# Patient Record
Sex: FEMALE | Race: BLACK OR AFRICAN AMERICAN | NOT HISPANIC OR LATINO | Employment: STUDENT | URBAN - METROPOLITAN AREA
[De-identification: names, ages, dates, MRNs, and addresses within clinical notes are randomized per-mention and may not be internally consistent; named-entity substitution may affect disease eponyms.]

---

## 2017-01-04 ENCOUNTER — INITIAL CONSULT (OUTPATIENT)
Dept: OPTOMETRY | Facility: CLINIC | Age: 22
End: 2017-01-04
Payer: COMMERCIAL

## 2017-01-04 DIAGNOSIS — H52.01 HYPEROPIA, RIGHT: ICD-10-CM

## 2017-01-04 DIAGNOSIS — H52.31 ANISOMETROPIA: ICD-10-CM

## 2017-01-04 DIAGNOSIS — H53.021 ANISOMETROPIC AMBLYOPIA OF RIGHT EYE: Primary | ICD-10-CM

## 2017-01-04 PROCEDURE — 92015 DETERMINE REFRACTIVE STATE: CPT | Mod: S$GLB,,, | Performed by: OPTOMETRIST

## 2017-01-04 PROCEDURE — 99999 PR PBB SHADOW E&M-EST. PATIENT-LVL II: CPT | Mod: PBBFAC,,, | Performed by: OPTOMETRIST

## 2017-01-04 PROCEDURE — 92004 COMPRE OPH EXAM NEW PT 1/>: CPT | Mod: S$GLB,,, | Performed by: OPTOMETRIST

## 2017-01-04 NOTE — MR AVS SNAPSHOT
[facility]  [address]  [place] [address]  Phone: [phone]                  Jia Marcelo   2017 2:40 PM   Initial consult    Description:  Female : 1995   Provider:  Betty Magana OD   [facility]           Reason for Visit     Eye Exam                To Do List           Goals (5 Years of Data)     None      Follow-Up and Disposition     Return in about 1 year (around 2018) for annual eye examination.      [facility]     [facility] -  Assistance  Registered nurses in the [facility] provide clinical advisement, health education, appointment booking, and other advisory services.  Call for this free service at [phone].             Medications           Message regarding Medications     Verify the changes and/or additions to your medication regime listed below are the same as discussed with your clinician today.  If any of these changes or additions are incorrect, please notify your healthcare provider.             Verify that the below list of medications is an accurate representation of the medications you are currently taking.  If none reported, the list may be blank. If incorrect, please contact your healthcare provider. Carry this list with you in case of emergency.           Current Medications     cetirizine (ZYRTEC) 10 MG tablet Take 1 tablet (10 mg total) by mouth once daily.    drospirenone-ethinyl estradiol (TRISTEN, 28,) 3-0.03 mg per tablet Take 1 tablet by mouth once daily.    EPIDUO 0.1-2.5 % GlwP JOSE CRUZ AA ON FACE QHS    fluticasone (FLONASE) 50 mcg/actuation nasal spray 1 spray by Each Nare route once daily.    naproxen sodium (ANAPROX) 275 MG tablet Take 1 tablet (275 mg total) by mouth every 8 (eight) hours as needed.    propranolol (INDERAL [place]) 120 MG 24 hr capsule Take 1 capsule (120 mg total) by mouth once daily.    pseudoephedrine-guaifenesin (MUCINEX D MAXIMUM  STRENGTH) 120-1,200 mg Tb12 1 tab every 12 hours as needed for congestion    sumatriptan (IMITREX) 25 MG Tab Two at start of headache; repeat in 2 hours if needed           Clinical Reference Information           Allergies as of 1/4/2017     Flagyl [Metronidazole Hcl]      Immunizations Administered on Date of Encounter - 1/4/2017     None      Instructions    Thank you very much for coming in for your eye examination. It was a pleasure working with you today. Below is some information you might find helpful.     AMBLYOPIA (LAZY EYE)    Amblyopia, also known as lazy eye, occurs when the central vision does not develop properly.    Amblyopia generally develops in young children, before age six. Its symptoms often are noted by parents in the form of squinting or closing one eye to see.  Other signs include overall poor visual acuity, eyestrain and headaches.    Causes of Amblyopia:    Trauma to the eye at any age can cause amblyopia, as well as a strong uncorrected refractive error (nearsightedness or farsightedness) or an eye muscle imbalance (strabismus).     Amblyopia Treatment    It's important to correct amblyopia as early as possible to allow maximum development of vision.    Children can be treated by patching the stronger eye, providing glasses to correct nearsightedness or farsightedness,  and in some cases using atropine eye drops.  Placing a patch over the stronger eye will force the weaker eye to learn to see.  Patching may be required for several hours a day and may continue for weeks or months.  Amblyopia will not go away on its own, and untreated  can lead to permanent visual problems and poor depth perception. If later in life the child's stronger eye develops disease or is injured, he or she will be dependent on the poor vision of the amblyopic eye, so it is best to treat amblyopia early on.    Betty Magana, OD

## 2017-01-04 NOTE — PROGRESS NOTES
HPI     Patient here for comprehensive eye exam   Pt states she is amblyopic and has not had an eye exam in 2 years. Pt was   8-10 when she started wearing glasses.   Pt states that she has a few older pair of glasses that she only usually   wear while reading or on the computer, glasses aren't with pt today.   Pt reports satisfactory distance vision without glasses.     (+)drops uses generic OTC lubricating drop, rarely uses maybe a few times   ever 3 months   (-)pain  (-)flashes  (-)floaters  (-)diplopia    Diabetic no  LBS n/a  No results found for: HGBA1C    OCULAR HISTORY  Last Eye Exam 2 yeas ago   (-)eye surgery   (-)eye injury   (+)diagnosed or treated for any eye conditions or diseases: amblyopia     FAMILY HISTORY  (+)Glaucoma maternal great grandmother   (-)Macular Degeneration none          Last edited by Betty Magana, OD on 1/4/2017  3:43 PM.         Assessment /Plan     For exam results, see Encounter Report.    Anisometropic amblyopia of right eye   Cause of reduced best-corrected visual acuity OD (20/70+1). Pt started wearing glasses when she was 8-10 years old. Recommended full-time wear of polycarbonate lenses to protect OS.    Anisometropia  Hyperopia, right   New glasses prescription released, adaptation expected.  Recommended full-time wear of polycarbonate lenses.  Eyeglass Final Rx     Eyeglass Final Rx      Sphere Cylinder Axis   Right +3.25 +0.50 130   Left -0.25 +0.50 075       Type:  SVL    Expiration Date:  1/5/2018    Comments:  Opposite signs correct.  Polycarbonate, full-time wear.                 RTC 1 year

## 2017-01-04 NOTE — PATIENT INSTRUCTIONS
Thank you very much for coming in for your eye examination. It was a pleasure working with you today. Below is some information you might find helpful.     AMBLYOPIA (LAZY EYE)    Amblyopia, also known as lazy eye, occurs when the central vision does not develop properly.    Amblyopia generally develops in young children, before age six. Its symptoms often are noted by parents in the form of squinting or closing one eye to see.  Other signs include overall poor visual acuity, eyestrain and headaches.    Causes of Amblyopia:    Trauma to the eye at any age can cause amblyopia, as well as a strong uncorrected refractive error (nearsightedness or farsightedness) or an eye muscle imbalance (strabismus).     Amblyopia Treatment    It's important to correct amblyopia as early as possible to allow maximum development of vision.    Children can be treated by patching the stronger eye, providing glasses to correct nearsightedness or farsightedness,  and in some cases using atropine eye drops.  Placing a patch over the stronger eye will force the weaker eye to learn to see.  Patching may be required for several hours a day and may continue for weeks or months.  Amblyopia will not go away on its own, and untreated  can lead to permanent visual problems and poor depth perception. If later in life the child's stronger eye develops disease or is injured, he or she will be dependent on the poor vision of the amblyopic eye, so it is best to treat amblyopia early on.    Betty Magana OD

## 2017-02-01 ENCOUNTER — OFFICE VISIT (OUTPATIENT)
Dept: OBSTETRICS AND GYNECOLOGY | Facility: CLINIC | Age: 22
End: 2017-02-01
Payer: COMMERCIAL

## 2017-02-01 VITALS
HEIGHT: 61 IN | BODY MASS INDEX: 24.39 KG/M2 | DIASTOLIC BLOOD PRESSURE: 76 MMHG | WEIGHT: 129.19 LBS | SYSTOLIC BLOOD PRESSURE: 118 MMHG

## 2017-02-01 DIAGNOSIS — N94.6 DYSMENORRHEA: ICD-10-CM

## 2017-02-01 DIAGNOSIS — N76.0 ACUTE VAGINITIS: ICD-10-CM

## 2017-02-01 DIAGNOSIS — Z80.3 FAMILY HISTORY OF BREAST CANCER IN FIRST DEGREE RELATIVE: ICD-10-CM

## 2017-02-01 DIAGNOSIS — Z01.419 WOMEN'S ANNUAL ROUTINE GYNECOLOGICAL EXAMINATION: Primary | ICD-10-CM

## 2017-02-01 LAB
CANDIDA RRNA VAG QL PROBE: NEGATIVE
G VAGINALIS RRNA GENITAL QL PROBE: NEGATIVE
T VAGINALIS RRNA GENITAL QL PROBE: NEGATIVE

## 2017-02-01 PROCEDURE — 87480 CANDIDA DNA DIR PROBE: CPT

## 2017-02-01 PROCEDURE — 99999 PR PBB SHADOW E&M-EST. PATIENT-LVL III: CPT | Mod: PBBFAC,,, | Performed by: NURSE PRACTITIONER

## 2017-02-01 PROCEDURE — 99395 PREV VISIT EST AGE 18-39: CPT | Mod: S$GLB,,, | Performed by: NURSE PRACTITIONER

## 2017-02-01 RX ORDER — NAPROXEN 500 MG/1
500 TABLET ORAL 2 TIMES DAILY WITH MEALS
Qty: 60 TABLET | Refills: 2 | Status: SHIPPED | OUTPATIENT
Start: 2017-02-01 | End: 2017-02-22

## 2017-02-01 NOTE — MR AVS SNAPSHOT
[facility] [address]  [address]  [facility] [address]  Phone: [phone]  Fax: [phone]                  Jia Marcelo   2017 2:20 PM   Office Visit    Description:  Female : 1995   Provider:  Kirsten Munoz NP   Department:  [facility] [address]           Reason for Visit     Annual Exam     Vaginitis                To Do List           Future Appointments        Provider Department Dept Phone    2017 2:20 PM Kirsten Munoz NP [facility] [address] [phone]      Goals (5 Years of Data)     None      [facility]     [facility] -  Assistance  Registered nurses in the [facility] provide clinical advisement, health education, appointment booking, and other advisory services.  Call for this free service at [phone].             Medications           Message regarding Medications     Verify the changes and/or additions to your medication regime listed below are the same as discussed with your clinician today.  If any of these changes or additions are incorrect, please notify your healthcare provider.             Verify that the below list of medications is an accurate representation of the medications you are currently taking.  If none reported, the list may be blank. If incorrect, please contact your healthcare provider. Carry this list with you in case of emergency.           Current Medications     cetirizine (ZYRTEC) 10 MG tablet Take 1 tablet (10 mg total) by mouth once daily.    drospirenone-ethinyl estradiol (TRISTEN, 28,) 3-0.03 mg per tablet Take 1 tablet by mouth once daily.    EPIDUO 0.1-2.5 % GlwP JOSE CRUZ AA ON FACE QHS    fluticasone (FLONASE) 50 mcg/actuation nasal spray 1 spray by Each Nare route once daily.    naproxen sodium (ANAPROX) 275 MG tablet Take 1 tablet (275 mg total) by mouth every 8 (eight) hours as needed.    propranolol (INDERAL [place]) 120 MG 24 hr capsule Take 1 capsule  "(120 mg total) by mouth once daily.    sumatriptan (IMITREX) 25 MG Tab Two at start of headache; repeat in 2 hours if needed    pseudoephedrine-guaifenesin (MUCINEX D MAXIMUM STRENGTH) 120-1,200 mg Tb12 1 tab every 12 hours as needed for congestion           Clinical Reference Information           Vital Signs - Last Recorded  Most recent update: 2/1/2017  2:18 PM by Marzena Asif MA    BP Ht Wt LMP BMI    118/76 5' 1" (1.549 m) 58.6 kg (129 lb 3 oz) 01/31/2017 (Approximate) 24.41 kg/m2      Blood Pressure          Most Recent Value    BP  118/76      Allergies as of 2/1/2017     Flagyl [Metronidazole Hcl]      Immunizations Administered on Date of Encounter - 2/1/2017     None      "

## 2017-02-01 NOTE — PROGRESS NOTES
CC: Annual  HPI: Pt is a 21 y.o.  female who presents for routine annual exam. She uses OCPs for contraception. She does not want STD screening.  Pt is requesting a refill for naproxen for her menstrual cramps.  Reports vaginal discharge, itching, odor. Pt is s/p the HPV vaccie series.    Reports  + family history of breast cancer- mother (diagnosed in her early 30's), maternal aunt (diagnosed in her 40's), and paternal grandmother (diagnosed in her 40's).      ROS:  GENERAL: Feeling well overall. Denies fever or chills.   SKIN: Denies rash or lesions.   HEAD: Denies head injury or headache.   NODES: Denies enlarged lymph nodes.   CHEST: Denies chest pain or shortness of breath.   CARDIOVASCULAR: Denies palpitations or left sided chest pain.   ABDOMEN: No abdominal pain, constipation, diarrhea, nausea, vomiting or rectal bleeding.   URINARY: No dysuria, hematuria, or burning on urination.  REPRODUCTIVE: See HPI.   BREASTS: Denies pain, lumps, or nipple discharge.   HEMATOLOGIC: No easy bruisability or excessive bleeding.   MUSCULOSKELETAL: Denies joint pain or swelling.   NEUROLOGIC: Denies syncope or weakness.   PSYCHIATRIC: Denies depression, anxiety or mood swings.    PE:   APPEARANCE: Well nourished, well developed, Black or  female in no acute distress.  NODES: no cervical, supraclavicular, or inguinal lymphadenopathy  BREASTS: Symmetrical, no skin changes or visible lesions. No palpable masses, nipple discharge or adenopathy bilaterally.  ABDOMEN: Soft. No tenderness or masses. No distention. No hernias palpated. No CVA tenderness.  VULVA: No lesions. Normal external female genitalia.  URETHRAL MEATUS: Normal size and location, no lesions, no prolapse.  URETHRA: No masses, tenderness, or prolapse.  VAGINA: Moist. No lesions or lacerations noted. No abnormal discharge present. No odor present.   CERVIX: No lesions or discharge. No cervical motion tenderness.   UTERUS: Normal size, regular shape,  mobile, non-tender.  ADNEXA: No tenderness. No fullness or masses palpated in the adnexal regions.   ANUS PERINEUM: Normal.      Diagnosis:  1. Women's annual routine gynecological examination    2. Acute vaginitis    3. Dysmenorrhea        Plan:   Pap smear not done/ not indicated- < 21y.o.  Affirm  If + BV will given [place]- pt is allergic to Flagyl  Discussed pt will begin annual mammograms at age 31  Naproxen refilled    Orders Placed This Encounter    Vaginosis Screen by DNA Probe    naproxen (NAPROSYN) 500 MG tablet       Patient was counseled today on the new ACS guidelines for cervical cytology screening as well as the current recommendations for breast cancer screening. She was counseled to follow up with her PCP for other routine health maintenance. Counseling session lasted approximately 10 minutes, and all her questions were answered.    Follow-up with me in 1 year for routine exam; pap at age 22      Kirsten Munoz, MARYP-C

## 2017-02-08 ENCOUNTER — TELEPHONE (OUTPATIENT)
Dept: OBSTETRICS AND GYNECOLOGY | Facility: CLINIC | Age: 22
End: 2017-02-08

## 2017-02-08 NOTE — TELEPHONE ENCOUNTER
----- Message from Gina Abdi sent at 2/8/2017  1:44 PM CST -----  Contact: Patient  X _1st Request  _  2nd Request  _  3rd Request    Who:HAILEE PECK [[id number]]    Why:Patient was returning a call back from the staff     What Number to Call Back:Patient can be reached at [phone]    When to Expect a call back: (Before the end of the day)   -- if call after 3:00 call back will be tomorrow.

## 2017-02-08 NOTE — TELEPHONE ENCOUNTER
----- Message from Loni Mathew sent at 2/8/2017 12:52 PM CST -----  Contact: HAILEE PECK [[id number]]  _  1st Request  _  2nd Request  _  3rd Request        Who: HAILEE PECK [[id number]]    Why: Patient states she was told to call when she had another vaginal fissure. Patient states she would like to be seen today.    What Number to Call Back: [phone]    When to Expect a call back: (Before the end of the day)   -- if call after 3:00 call back will be tomorrow.

## 2017-02-08 NOTE — TELEPHONE ENCOUNTER
Scheduled pt on 02/09 at 8:40am with DEBBIE Curtis. Pt aware of time and location of appt, no further questions were asked.

## 2017-02-12 ENCOUNTER — HOSPITAL ENCOUNTER (EMERGENCY)
Facility: OTHER | Age: 22
Discharge: HOME | End: 2017-02-12
Attending: EMERGENCY MEDICINE
Payer: COMMERCIAL

## 2017-02-12 VITALS
TEMPERATURE: 98 F | SYSTOLIC BLOOD PRESSURE: 123 MMHG | BODY MASS INDEX: 24.17 KG/M2 | DIASTOLIC BLOOD PRESSURE: 81 MMHG | HEIGHT: 61 IN | HEART RATE: 75 BPM | RESPIRATION RATE: 16 BRPM | WEIGHT: 128 LBS | OXYGEN SATURATION: 99 %

## 2017-02-12 DIAGNOSIS — G43.909 MIGRAINE WITHOUT STATUS MIGRAINOSUS, NOT INTRACTABLE, UNSPECIFIED MIGRAINE TYPE: Primary | ICD-10-CM

## 2017-02-12 LAB
B-HCG UR QL: NEGATIVE
CTP QC/QA: YES

## 2017-02-12 PROCEDURE — 99283 EMERGENCY DEPT VISIT LOW MDM: CPT | Mod: 25

## 2017-02-12 PROCEDURE — 81025 URINE PREGNANCY TEST: CPT | Performed by: EMERGENCY MEDICINE

## 2017-02-12 PROCEDURE — 25000003 PHARM REV CODE 250: Performed by: PHYSICIAN ASSISTANT

## 2017-02-12 RX ORDER — BUTALBITAL, ACETAMINOPHEN AND CAFFEINE 50; 325; 40 MG/1; MG/1; MG/1
1 TABLET ORAL EVERY 4 HOURS PRN
Qty: 15 TABLET | Refills: 0 | Status: SHIPPED | OUTPATIENT
Start: 2017-02-12 | End: 2017-02-22

## 2017-02-12 RX ORDER — BUTALBITAL, ACETAMINOPHEN AND CAFFEINE 50; 325; 40 MG/1; MG/1; MG/1
2 TABLET ORAL
Status: COMPLETED | OUTPATIENT
Start: 2017-02-12 | End: 2017-02-12

## 2017-02-12 RX ADMIN — BUTALBITAL, ACETAMINOPHEN AND CAFFEINE 2 TABLET: 50; 325; 40 TABLET ORAL at 12:02

## 2017-02-12 NOTE — ED PROVIDER NOTES
Encounter Date: 2/12/2017       History     Chief Complaint   Patient presents with    Headache     + headahce new onset 1 hour ago, denies blurred vision or dizziness. Reports tkaing 4 Motrin w/ minimal relief.     Review of patient's allergies indicates:   Allergen Reactions    Flagyl [metronidazole hcl] Nausea And Vomiting     HPI Comments: Patient is a 21-year-old female with history of migraines who presents to the emergency department with headache.  Patient states this feels like her typical migraine.  Patient states she's been having more frequent migraines lately.  Patient states she has run out of her Imitrex.  Patient states this headache began approximately 1 hour ago.  Patient denies visual disturbance.  Patient reports photophobia and nausea.  Patient states she took 4 Motrin with no relief of her symptoms.  Patient reports some neck pain with no stiffness.  Patient denies fevers or chills.  Patient states she does not want any IV medication for her pain.    The history is provided by the patient.     Past Medical History   Diagnosis Date    Allergy      flonase    Amblyopia      [facility]     High cholesterol     Migraine headache      controlled with imitrex     Past Medical History Pertinent Negatives   Diagnosis Date Noted    Abnormal Pap smear of vagina 8/5/2015    Diabetes mellitus 2/12/2017    Hypertension 2/12/2017     History reviewed. No pertinent past surgical history.  Family History   Problem Relation Age of Onset    Cancer Mother 40     breast CA    Diabetes Mother     Hypertension Mother     Migraines Mother     No Known Problems Father     Other Sister     Cancer Paternal Aunt 41     breast CA    Hypertension Maternal Grandfather     Depression Paternal Grandmother     Cancer Maternal Grandmother 45     breast CA    Colon cancer Neg Hx     Ovarian cancer Neg Hx      Social History   Substance Use Topics    Smoking status:  Never Smoker    Smokeless tobacco: Never Used    Alcohol use No     Review of Systems   Constitutional: Negative for activity change, appetite change, chills, fatigue and fever.   HENT: Negative for congestion, ear discharge, ear pain, hearing loss, mouth sores, postnasal drip, rhinorrhea, sore throat and trouble swallowing.    Eyes: Positive for photophobia. Negative for visual disturbance.   Respiratory: Negative for cough and shortness of breath.    Cardiovascular: Negative for chest pain.   Gastrointestinal: Positive for nausea. Negative for abdominal pain, blood in stool, constipation, diarrhea and vomiting.   Genitourinary: Negative for dysuria, flank pain and hematuria.   Musculoskeletal: Negative for back pain, neck pain and neck stiffness.   Skin: Negative for rash and wound.   Neurological: Positive for headaches. Negative for dizziness, syncope, facial asymmetry, speech difficulty, weakness, light-headedness and numbness.       Physical Exam   Initial Vitals   BP Pulse Resp Temp SpO2   02/12/17 1104 02/12/17 1104 02/12/17 1104 02/12/17 1104 02/12/17 1104   136/99 74 16 98 °F (36.7 °C) 99 %     Physical Exam    Nursing note and vitals reviewed.  Constitutional: She appears well-developed and well-nourished. She is not diaphoretic.  Non-toxic appearance. No distress.   HENT:   Head: Normocephalic.   Right Ear: Hearing and external ear normal.   Left Ear: Hearing and external ear normal.   Nose: Nose normal.   Mouth/Throat: Uvula is midline and oropharynx is clear and moist. No trismus in the jaw. No oropharyngeal exudate.   Eyes: Conjunctivae are normal. Pupils are equal, round, and reactive to light.   Neck: Normal range of motion and full passive range of motion without pain. Neck supple. Normal range of motion present. No rigidity.   Cardiovascular: Normal rate and regular rhythm.   Pulmonary/Chest: Breath sounds normal. No respiratory distress. She has no wheezes. She has no rhonchi. She has no rales.  She exhibits no tenderness.   Abdominal: Soft. Bowel sounds are normal. She exhibits no distension and no mass. There is no tenderness. There is no rebound and no guarding.   Lymphadenopathy:     She has no cervical adenopathy.   Neurological: She is alert and oriented to person, place, and time. She has normal strength. No cranial nerve deficit or sensory deficit. She displays a negative Romberg sign. Coordination and gait normal.   Skin: Skin is warm and dry.   Psychiatric: She has a normal mood and affect.         ED Course   Procedures  Labs Reviewed   POCT URINE PREGNANCY - Normal             Medical Decision Making:   Initial Assessment:   Urgent evaluation of 21-year-old female with history of migraines who presents to the emergency department with headache.  Patient is afebrile, nontoxic appearing, and hemodynamically stable.  Patient states this feels occur typical migraine.  No nuchal rigidity.  No evidence of meningitis.  Normal neuro exam.  I do not suspect intracranial abnormality.  Patient states she does not want IV medications.  She also declines IM medications.  I offered Fioricet.  ED Management:  12:46 PM  On reevaluation, patient states her pain has improved some.  She requests prescription for Fioricet.  Patient is advised to follow-up with neurology.  Patient is advised to return to the emergency department with any worsening symptoms or concerns.  Other:   I have discussed this case with another health care provider.       <> Summary of the Discussion: Dr. Oneill                   ED Course     Clinical Impression:   The encounter diagnosis was Migraine without status migrainosus, not intractable, unspecified migraine type.          Nereida Middleton PA-C  02/12/17 1247

## 2017-02-12 NOTE — ED NOTES
Pt presents to ED with a right sided migraine that started an hour ago. Reports nausea and neck pain. Pain 7/10, worse at right temple that radiates to neck. Took 4 X 200 mg ibuprofen at about 1030 without pain improvement. Sound and light worsen symptoms. Reports history of migraines, more recently reports they have been happening everyday. Reports history propananlol and immitrex for migraines. Propapanol was prescribed months ago but patient reported she first starting to take over the weekend but had stopped it 2 days ago but stopped because she felt her migraines were happening more frequently. Reports takes immitrex as needed but is currently out of that perscription. Denies vomiting, dizziness.

## 2017-02-12 NOTE — DISCHARGE INSTRUCTIONS

## 2017-02-12 NOTE — ED AVS SNAPSHOT
[facility]  [address]  [facility] [address]               Jia Marcelo   2017 11:29 AM   ED    Description:  Female : 1995   Department:  [facility]           Your Care was Coordinated By:     Provider Role From To    Juliette Oneill MD Attending Provider [phone] --    Nereida Middleton PA-C Physician Assistant 17 [phone] --      Reason for Visit     Headache           Diagnoses this Visit        Comments    Migraine without status migrainosus, not intractable, unspecified migraine type    -  Primary       ED Disposition     ED Disposition Condition Comment    Discharge             To Do List           Follow-up Information     Follow up with Haily Mcintosh MD.    Specialty:  Internal Medicine    Contact information:    [address]  [facility] [address]  [phone]         These Medications        Disp Refills Start End    butalbital-acetaminophen-caffeine -40 mg (FIORICET, ESGIC) -40 mg per tablet 15 tablet 0 2017 3/14/2017    Take 1 tablet by mouth every 4 (four) hours as needed for Headaches. - Oral    Pharmacy: [facility] [address] [place] [address] AT [facility] Ph #: [phone]         [facility] On Call     [facility] On [facility] Nurse Care Line -  Assistance  Registered nurses in the [facility] Center provide clinical advisement, health education, appointment booking, and other advisory services.  Call for this free service at [phone].             Medications           Message regarding Medications     Verify the changes and/or additions to your medication regime listed below are the same as discussed with your clinician today.  If any of these changes or additions are incorrect, please notify your healthcare provider.        START taking these NEW medications        Refills    butalbital-acetaminophen-caffeine -40 mg  "(FIORICET, ESGIC) -40 mg per tablet 0    Sig: Take 1 tablet by mouth every 4 (four) hours as needed for Headaches.    Class: Print    Route: Oral      These medications were administered today        Dose Freq    butalbital-acetaminophen-caffeine -40 mg per tablet 2 tablet 2 tablet ED 1 Time    Sig: Take 2 tablets by mouth ED 1 Time.    Class: Normal    Route: Oral           Verify that the below list of medications is an accurate representation of the medications you are currently taking.  If none reported, the list may be blank. If incorrect, please contact your healthcare provider. Carry this list with you in case of emergency.           Current Medications     cetirizine (ZYRTEC) 10 MG tablet Take 1 tablet (10 mg total) by mouth once daily.    drospirenone-ethinyl estradiol (TRISTEN, 28,) 3-0.03 mg per tablet Take 1 tablet by mouth once daily.    EPIDUO 0.1-2.5 % GlwP JOSE CRUZ AA ON FACE QHS    propranolol (INDERAL LA) 120 MG 24 hr capsule Take 1 capsule (120 mg total) by mouth once daily.    butalbital-acetaminophen-caffeine -40 mg (FIORICET, ESGIC) -40 mg per tablet Take 1 tablet by mouth every 4 (four) hours as needed for Headaches.    fluticasone (FLONASE) 50 mcg/actuation nasal spray 1 spray by Each Nare route once daily.    naproxen (NAPROSYN) 500 MG tablet Take 1 tablet (500 mg total) by mouth 2 (two) times daily with meals.    naproxen sodium (ANAPROX) 275 MG tablet Take 1 tablet (275 mg total) by mouth every 8 (eight) hours as needed.    pseudoephedrine-guaifenesin (MUCINEX D MAXIMUM STRENGTH) 120-1,200 mg Tb12 1 tab every 12 hours as needed for congestion    sumatriptan (IMITREX) 25 MG Tab Two at start of headache; repeat in 2 hours if needed           Clinical Reference Information           Your Vitals Were     BP Pulse Temp Resp Height Weight    136/99 (BP Location: Left arm, Patient Position: Sitting) 74 98 °F (36.7 °C) (Oral) 16 5' 1" (1.549 m) 58.1 kg (128 lb)    Last Period " SpO2 BMI          01/31/2017 (Approximate) 99% 24.19 kg/m2        Allergies as of 2/12/2017        Reactions    Flagyl [Metronidazole Hcl] Nausea And Vomiting      Immunizations Administered on Date of Encounter - 2/12/2017     None      ED Micro, Lab, POCT     Start Ordered       Status Ordering Provider    02/12/17 1106 02/12/17 1106  POCT urine pregnancy  Once      Final result       ED Imaging Orders     None        Discharge Instructions         Migraine Headache  This often severe type of headache is different from other types of headaches in that symptoms other than pain occur with the headache. Nausea and vomiting, lightheadedness, sensitivity to light (photophobia), and other visual disturbances are common migraine symptoms. The pain may last from a few hours to several days. It is not clear why migraines occur but certain factors called triggers can raise the risk of having a migraine attack. A migraine may be triggered by emotional stress or depression, or by hormone changes during the menstrual cycle. Other triggers include birth control pills, overuse of migraine medicines, alcohol or caffeine, foods with tyramine (such as aged cheese and wine), eyestrain, weather changes, missed meals, or too little or too much sleep.  Home care  Follow these tips when taking care of yourself at home:  · Dont drive yourself home if you were given pain medicine for your headache or are having visual symptoms. Instead, have someone else drive you home. Try to sleep when you get home. You should feel much better when you wake up.  · Cold can help ease migraine symptoms. Put an ice pack on your forehead or at the base of your skull. Put heat on the back of your neck to help ease any neck spasm.  · Drink only clear liquids or eat a light diet until your symptoms get better. This will help you avoid nausea and vomiting.  How to prevent migraines  Pay attention to what seems to trigger your headache. Try to avoid the  triggers when you can. If you have frequent headaches, consider keeping a headache diary. In it, write down what you were doing, feeling, or eating in the hours before each headache. Show this to your healthcare provider to help find the cause of your headaches.  If stress seems to be a trigger for your headaches, figure out what is causing stress in your life. Learn new ways to handle your stress. Ideas include regular exercise, biofeedback, self-hypnosis, yoga, and meditation. Talk with your healthcare provider to find out more information about managing stress. Many books and digital media are also available on this subject.  Tyramine is a substance found in many foods. It can trigger a migraine in some people. These foods contain tyramine:  · Chocolate  · Yogurt  · All cheeses, but especially aged cheeses  · Smoked or pickled fish and meat, including herring, caviar, bologna, pepperoni, and salami  · Liver  · Avocados  · Bananas  · Figs  · Raisins  · Red wine  Try staying away from these foods for 1 to 2 months to see if you have fewer headaches.  How to treat future headaches  · Take time out at the first sign of a headache, if possible. Find a quiet, dark, comfortable place to sit or lie down. Let yourself relax or sleep.  · Put an ice pack on your forehead or on the area of greatest pain. A heating pad and massage may help if you are having a muscle spasm and tightness in your neck.  · If you have been prescribed a medicine to stop a migraine headache, use this at the first warning sign of the headache for best results. First signs may be an aura or pain.  · If you need to take medicine often for your migraine, talk with your healthcare provider about other ways to prevent your headaches.  Follow-up care  Follow up with your healthcare provider, or as advised. Talk with your provider if you have frequent headaches. He or she can figure out a treatment plan. Ask if you can have medicine to take at home the  next time you get a bad headache. This may keep you from having to visit the emergency department in the future. You may need to see a headache specialist (neurologist) if you continue to have headaches.  When to seek medical advice  Call your healthcare provider right away if any of these occur:  · Your head pain gets worse, or doesnt get better within 24 hours  · You cant keep liquids down (repeated vomiting)  · Pain in your sinuses, ears, or throat  · Fever of 100.4º F (38º C) or higher, or as directed by your healthcare provider  · Stiff neck  · Extreme drowsiness, confusion, or fainting  · Dizziness, or dizziness with spinning sensation (vertigo)  · Weakness in an arm or leg, or on one side of your face  · Difficulty talking or seeing  Date Last Reviewed: 8/1/2016  © 1115-6331 [employer]. [address] [place] [address]. All rights reserved. This information is not intended as a substitute for professional medical care. Always follow your healthcare professional's instructions.          Your Scheduled Appointments     Feb 14, 2017  3:20 PM CST   Established Patient Visit with Alee Amezquita NP   [facility] [address] ([facility]    [address] [place] [id number] [phone]               [facility] complies with applicable Federal civil rights laws and does not discriminate on the basis of race, color, national origin, age, disability, or sex.        Language Assistance Services     ATTENTION: Language assistance services are available, free of charge. Please call [phone].      ATENCIÓN: Si habla bee, tiene a espinal disposición servicios gratuitos de asistencia lingüística. Llame al [phone].     CHÚ Ý: N?u b?n nói Ti?ng Vi?t, có các d?ch v? h? tr? ngôn ng? mi?n phí dành cho b?n. G?i s? [phone].

## 2017-02-22 ENCOUNTER — OFFICE VISIT (OUTPATIENT)
Dept: INTERNAL MEDICINE | Facility: CLINIC | Age: 22
End: 2017-02-22
Payer: COMMERCIAL

## 2017-02-22 VITALS
BODY MASS INDEX: 24.22 KG/M2 | HEIGHT: 61 IN | RESPIRATION RATE: 17 BRPM | WEIGHT: 128.31 LBS | SYSTOLIC BLOOD PRESSURE: 102 MMHG | DIASTOLIC BLOOD PRESSURE: 78 MMHG | HEART RATE: 85 BPM

## 2017-02-22 DIAGNOSIS — G43.709 CHRONIC MIGRAINE WITHOUT AURA WITHOUT STATUS MIGRAINOSUS, NOT INTRACTABLE: Primary | ICD-10-CM

## 2017-02-22 DIAGNOSIS — R35.0 URINARY FREQUENCY: ICD-10-CM

## 2017-02-22 DIAGNOSIS — R00.2 PALPITATION: ICD-10-CM

## 2017-02-22 LAB
BACTERIA #/AREA URNS AUTO: ABNORMAL /HPF
BILIRUB UR QL STRIP: NEGATIVE
CLARITY UR REFRACT.AUTO: ABNORMAL
COLOR UR AUTO: YELLOW
GLUCOSE UR QL STRIP: NEGATIVE
HGB UR QL STRIP: NEGATIVE
KETONES UR QL STRIP: NEGATIVE
LEUKOCYTE ESTERASE UR QL STRIP: ABNORMAL
MICROSCOPIC COMMENT: ABNORMAL
NITRITE UR QL STRIP: NEGATIVE
PH UR STRIP: 6 [PH] (ref 5–8)
PROT UR QL STRIP: NEGATIVE
RBC #/AREA URNS AUTO: 4 /HPF (ref 0–4)
SP GR UR STRIP: 1.02 (ref 1–1.03)
SQUAMOUS #/AREA URNS AUTO: 2 /HPF
URN SPEC COLLECT METH UR: ABNORMAL
UROBILINOGEN UR STRIP-ACNC: NEGATIVE EU/DL
WBC #/AREA URNS AUTO: 71 /HPF (ref 0–5)
YEAST UR QL AUTO: ABNORMAL

## 2017-02-22 PROCEDURE — 87086 URINE CULTURE/COLONY COUNT: CPT

## 2017-02-22 PROCEDURE — 87088 URINE BACTERIA CULTURE: CPT

## 2017-02-22 PROCEDURE — 81001 URINALYSIS AUTO W/SCOPE: CPT

## 2017-02-22 PROCEDURE — 99999 PR PBB SHADOW E&M-EST. PATIENT-LVL IV: CPT | Mod: PBBFAC,,, | Performed by: INTERNAL MEDICINE

## 2017-02-22 PROCEDURE — 87147 CULTURE TYPE IMMUNOLOGIC: CPT

## 2017-02-22 PROCEDURE — 99214 OFFICE O/P EST MOD 30 MIN: CPT | Mod: S$GLB,,, | Performed by: INTERNAL MEDICINE

## 2017-02-22 RX ORDER — AMITRIPTYLINE HYDROCHLORIDE 50 MG/1
TABLET, FILM COATED ORAL
Qty: 30 TABLET | Refills: 2 | Status: SHIPPED | OUTPATIENT
Start: 2017-02-22 | End: 2018-03-14

## 2017-02-22 RX ORDER — SUMATRIPTAN SUCCINATE 25 MG/1
TABLET ORAL
Qty: 30 TABLET | Refills: 5 | Status: SHIPPED | OUTPATIENT
Start: 2017-02-22 | End: 2018-08-06 | Stop reason: SDUPTHER

## 2017-02-22 RX ORDER — AMITRIPTYLINE HYDROCHLORIDE 50 MG/1
TABLET, FILM COATED ORAL
Qty: 30 TABLET | Refills: 2 | Status: SHIPPED | OUTPATIENT
Start: 2017-02-22 | End: 2017-02-22 | Stop reason: SDUPTHER

## 2017-02-22 NOTE — MR AVS SNAPSHOT
[facility] - Internal Medicine  [address]  [facility] [address]  Phone: [phone]  Fax: [phone]                  Jia Marcelo   2017 11:20 AM   Office Visit    Description:  Female : 1995   Provider:  Haily Mcintosh MD   Department:  [facility]           Reason for Visit     Migraine           Diagnoses this Visit        Comments    Urinary frequency    -  Primary     Chronic migraine without aura without status migrainosus, not intractable         Palpitation                To Do List           Future Appointments        Provider Department Dept Phone    3/30/2017 3:00 PM MD [facility] Internal Medicine [phone]      Goals (5 Years of Data)     None       These Medications        Disp Refills Start End    sumatriptan (IMITREX) 25 MG Tab 30 tablet 5 2017     Two at start of headache; repeat in 2 hours if needed    Pharmacy: [facility] [address] [place] [address] [employer] AT [facility] Ph #: [phone]       amitriptyline (ELAVIL) 50 MG tablet 30 tablet 2 2017     Take 1/2 tab nightly.    Pharmacy: [facility] [address] [place] [address] [employer] AT [facility] Ph #: [phone]         [facility] On Call     [facility] On Call Nurse Care Line -  Assistance  Registered nurses in the [facility] On Call Center provide clinical advisement, health education, appointment booking, and other advisory services.  Call for this free service at [phone].             Medications           Message regarding Medications     Verify the changes and/or additions to your medication regime listed below are the same as discussed with your clinician today.  If any of these changes or additions are incorrect, please notify your healthcare provider.        START taking these NEW medications        Refills    amitriptyline (ELAVIL) 50 MG tablet 2    Sig: Take 1/2 tab nightly.    Class:  "Normal      STOP taking these medications     naproxen sodium (ANAPROX) 275 MG tablet Take 1 tablet (275 mg total) by mouth every 8 (eight) hours as needed.    naproxen (NAPROSYN) 500 MG tablet Take 1 tablet (500 mg total) by mouth 2 (two) times daily with meals.    butalbital-acetaminophen-caffeine -40 mg (FIORICET, ESGIC) -40 mg per tablet Take 1 tablet by mouth every 4 (four) hours as needed for Headaches.    propranolol (INDERAL LA) 120 MG 24 hr capsule Take 1 capsule (120 mg total) by mouth once daily.           Verify that the below list of medications is an accurate representation of the medications you are currently taking.  If none reported, the list may be blank. If incorrect, please contact your healthcare provider. Carry this list with you in case of emergency.           Current Medications     cetirizine (ZYRTEC) 10 MG tablet Take 1 tablet (10 mg total) by mouth once daily.    drospirenone-ethinyl estradiol (TRISTEN, 28,) 3-0.03 mg per tablet Take 1 tablet by mouth once daily.    EPIDUO 0.1-2.5 % GlwP JOSE CRUZ AA ON FACE QHS    fluticasone (FLONASE) 50 mcg/actuation nasal spray 1 spray by Each Nare route once daily.    pseudoephedrine-guaifenesin (MUCINEX D MAXIMUM STRENGTH) 120-1,200 mg Tb12 1 tab every 12 hours as needed for congestion    sumatriptan (IMITREX) 25 MG Tab Two at start of headache; repeat in 2 hours if needed    amitriptyline (ELAVIL) 50 MG tablet Take 1/2 tab nightly.           Clinical Reference Information           Your Vitals Were     BP Pulse Resp Height Weight Last Period    102/78 85 17 5' 1" (1.549 m) 58.2 kg (128 lb 4.9 oz) 02/09/2017    BMI                24.24 kg/m2          Blood Pressure          Most Recent Value    BP  102/78      Allergies as of 2/22/2017     Flagyl [Metronidazole Hcl]      Immunizations Administered on Date of Encounter - 2/22/2017     None      Orders Placed During Today's Visit      Normal Orders This Visit    Ambulatory Referral to Neurology  "    Urinalysis Microscopic     Urinalysis     Urine culture     Future Labs/Procedures Expected by Expires    MRI Brain Without Contrast  2/22/2017 2/22/2018    SCHEDULED EKG 12-LEAD (to Muse)  As directed 2/22/2018      Language Assistance Services     ATTENTION: Language assistance services are available, free of charge. Please call [phone].      ATENCIÓN: Si habla español, tiene a espinal disposición servicios gratuitos de asistencia lingüística. Llame al [phone].     CHÚ Ý: N?u b?n nói Ti?ng Vi?t, có các d?ch v? h? tr? ngôn ng? mi?n phí dành cho b?n. G?i s? [phone].         [facility] complies with applicable Federal civil rights laws and does not discriminate on the basis of race, color, national origin, age, disability, or sex.

## 2017-02-23 ENCOUNTER — HOSPITAL ENCOUNTER (OUTPATIENT)
Dept: CARDIOLOGY | Facility: CLINIC | Age: 22
Discharge: HOME | End: 2017-02-23
Payer: COMMERCIAL

## 2017-02-23 ENCOUNTER — HOSPITAL ENCOUNTER (OUTPATIENT)
Dept: RADIOLOGY | Facility: HOSPITAL | Age: 22
Discharge: HOME | End: 2017-02-23
Attending: INTERNAL MEDICINE
Payer: COMMERCIAL

## 2017-02-23 DIAGNOSIS — R00.2 PALPITATION: ICD-10-CM

## 2017-02-23 DIAGNOSIS — G43.709 CHRONIC MIGRAINE WITHOUT AURA WITHOUT STATUS MIGRAINOSUS, NOT INTRACTABLE: ICD-10-CM

## 2017-02-23 PROCEDURE — 93000 ELECTROCARDIOGRAM COMPLETE: CPT | Mod: S$GLB,,, | Performed by: INTERNAL MEDICINE

## 2017-02-23 PROCEDURE — 70551 MRI BRAIN STEM W/O DYE: CPT | Mod: TC

## 2017-02-23 PROCEDURE — 70551 MRI BRAIN STEM W/O DYE: CPT | Mod: 26,,, | Performed by: RADIOLOGY

## 2017-02-24 ENCOUNTER — PATIENT MESSAGE (OUTPATIENT)
Dept: INTERNAL MEDICINE | Facility: CLINIC | Age: 22
End: 2017-02-24

## 2017-02-24 LAB — BACTERIA UR CULT: NORMAL

## 2017-02-24 RX ORDER — AMOXICILLIN 875 MG/1
875 TABLET, FILM COATED ORAL EVERY 12 HOURS
Qty: 14 TABLET | Refills: 0 | Status: SHIPPED | OUTPATIENT
Start: 2017-02-24 | End: 2017-03-03

## 2017-03-27 DIAGNOSIS — N92.6 IRREGULAR MENSTRUAL BLEEDING: ICD-10-CM

## 2017-03-27 RX ORDER — DROSPIRENONE AND ETHINYL ESTRADIOL 0.03MG-3MG
KIT ORAL
Qty: 28 TABLET | Refills: 0 | Status: SHIPPED | OUTPATIENT
Start: 2017-03-27 | End: 2017-04-30 | Stop reason: SDUPTHER

## 2017-04-30 DIAGNOSIS — N92.6 IRREGULAR MENSTRUAL BLEEDING: ICD-10-CM

## 2017-05-01 RX ORDER — DROSPIRENONE AND ETHINYL ESTRADIOL 0.03MG-3MG
KIT ORAL
Qty: 28 TABLET | Refills: 0 | Status: SHIPPED | OUTPATIENT
Start: 2017-05-01 | End: 2017-06-05

## 2017-05-02 ENCOUNTER — TELEPHONE (OUTPATIENT)
Dept: OBSTETRICS AND GYNECOLOGY | Facility: CLINIC | Age: 22
End: 2017-05-02

## 2017-05-02 NOTE — TELEPHONE ENCOUNTER
Returning patients called and she informed me that she prefer [facility] not [facility]. Informed patient that I would contact pharmacy and make the changes. Patient verbalized understanding.     Called pharmacy at [phone] and made need changes.

## 2017-05-02 NOTE — TELEPHONE ENCOUNTER
----- Message from Christi Smiley sent at 5/2/2017  3:44 PM CDT -----  Contact: pt   X_  1st Request  _  2nd Request  _  3rd Request    Who:HAILEE PECK [[id number]]    Why: Patient states she has questions in regards to her birth control...... Please contact patient to further discuss and advise     What Number to Call Back: [phone]    When to Expect a call back: (Before the end of the day)   -- if call after 3:00 call back will be tomorrow.

## 2017-05-25 ENCOUNTER — OFFICE VISIT (OUTPATIENT)
Dept: OBSTETRICS AND GYNECOLOGY | Facility: CLINIC | Age: 22
End: 2017-05-25
Payer: COMMERCIAL

## 2017-05-25 VITALS
BODY MASS INDEX: 24.47 KG/M2 | SYSTOLIC BLOOD PRESSURE: 116 MMHG | HEIGHT: 61 IN | WEIGHT: 129.63 LBS | DIASTOLIC BLOOD PRESSURE: 74 MMHG

## 2017-05-25 DIAGNOSIS — N76.0 ACUTE VAGINITIS: Primary | ICD-10-CM

## 2017-05-25 LAB
C TRACH DNA SPEC QL NAA+PROBE: NOT DETECTED
CANDIDA RRNA VAG QL PROBE: NEGATIVE
G VAGINALIS RRNA GENITAL QL PROBE: NEGATIVE
N GONORRHOEA DNA SPEC QL NAA+PROBE: NOT DETECTED
T VAGINALIS RRNA GENITAL QL PROBE: NEGATIVE

## 2017-05-25 PROCEDURE — 99213 OFFICE O/P EST LOW 20 MIN: CPT | Mod: S$GLB,,, | Performed by: NURSE PRACTITIONER

## 2017-05-25 PROCEDURE — 87480 CANDIDA DNA DIR PROBE: CPT

## 2017-05-25 PROCEDURE — 87591 N.GONORRHOEAE DNA AMP PROB: CPT

## 2017-05-25 PROCEDURE — 99999 PR PBB SHADOW E&M-EST. PATIENT-LVL III: CPT | Mod: PBBFAC,,, | Performed by: NURSE PRACTITIONER

## 2017-05-25 NOTE — PROGRESS NOTES
CC: Vaginal Discharge    Jia Marcelo is a 21 y.o. female  presents with complaint of vaginal discharge for 4 weeks.  She reports itching.  denies odor.  She states the discharge is clear. No new sexual partners.  She has not tried any alleviating factors.  Reports she also has been experiencing some vaginal dryness on and off.          ROS:  GENERAL: No fever, chills, fatigability or weight loss.  VULVAR: No pain, no lesions and no itching.  VAGINAL: No relaxation, + itching, + discharge, no abnormal bleeding and no lesions.  ABDOMEN: No abdominal pain. Denies nausea. Denies vomiting. No diarrhea. No constipation  BREAST: Denies pain. No lumps. No discharge.  URINARY: No incontinence, no nocturia, no frequency and no dysuria.  CARDIOVASCULAR: No chest pain. No shortness of breath. No leg cramps.  NEUROLOGICAL: No headaches. No vision changes.    PHYSICAL EXAM:  VULVA: normal appearing vulva with no masses, tenderness or lesions   VAGINA: normal appearing vagina with normal color and + discharge, no lesions   CERVIX: normal appearing cervix without discharge or lesions   UTERUS: uterus is normal size, shape, consistency and nontender   ADNEXA: normal adnexa in size, nontender and no masses    ASSESSMENT and PLAN:    ICD-10-CM ICD-9-CM    1. Acute vaginitis N76.0 616.10 C. trachomatis/N. gonorrhoeae by AMP DNA Cervix      Vaginosis Screen by DNA Probe     GCCT  Affirm    Patient was counseled today on vaginitis prevention including :  a. avoiding feminine products such as deoderant soaps, body wash, bubble bath, douches, scented toilet paper, deoderant tampons or pads, feminine wipes, chronic pad use, etc.  b. avoiding other vulvovaginal irritants such as long hot baths, humidity, tight, synthetic clothing, chlorine and sitting around in wet bathing suits  c. wearing cotton underwear, avoiding thong underwear and no underwear to bed  d. taking showers instead of baths and use a hair dryer on cool  setting afterwards to dry  e. wearing cotton to exercise and shower immediately after exercise and change clothes  f. using polyurethane condoms without spermicide if sexually active and symptoms are triggered by intercourse    FOLLOW UP: PRN lack of improvement.    Kirsten Munoz, FNP-C

## 2017-06-05 NOTE — TELEPHONE ENCOUNTER
----- Message from Shilpa Aiken sent at 6/5/2017  4:43 PM CDT -----  Contact: self  Patient is requesting a Rx refill for Veronica to be sent to her Pharmacy. Patient uses [facility] on [facility] in [place], [place]. Patient can be reached at [phone].

## 2017-06-05 NOTE — TELEPHONE ENCOUNTER
Called to inform patient that her Rx refill request has been sent to her named pharmacy. No answer, left message to call office at [phone].

## 2017-06-06 RX ORDER — DROSPIRENONE AND ETHINYL ESTRADIOL 0.03MG-3MG
1 KIT ORAL DAILY
Qty: 28 TABLET | Refills: 11 | Status: SHIPPED | OUTPATIENT
Start: 2017-06-06 | End: 2018-08-06

## 2017-06-30 ENCOUNTER — OFFICE VISIT (OUTPATIENT)
Dept: INTERNAL MEDICINE | Facility: CLINIC | Age: 22
End: 2017-06-30
Payer: COMMERCIAL

## 2017-06-30 VITALS
DIASTOLIC BLOOD PRESSURE: 93 MMHG | HEIGHT: 61 IN | BODY MASS INDEX: 24.44 KG/M2 | WEIGHT: 129.44 LBS | SYSTOLIC BLOOD PRESSURE: 122 MMHG | HEART RATE: 89 BPM

## 2017-06-30 DIAGNOSIS — M53.3 COCCYX PAIN: Primary | ICD-10-CM

## 2017-06-30 PROCEDURE — 99999 PR PBB SHADOW E&M-EST. PATIENT-LVL III: CPT | Mod: PBBFAC,,, | Performed by: INTERNAL MEDICINE

## 2017-06-30 PROCEDURE — 99213 OFFICE O/P EST LOW 20 MIN: CPT | Mod: S$GLB,,, | Performed by: INTERNAL MEDICINE

## 2017-06-30 RX ORDER — NAPROXEN 500 MG/1
500 TABLET ORAL 2 TIMES DAILY
Qty: 30 TABLET | Refills: 0 | Status: SHIPPED | OUTPATIENT
Start: 2017-06-30

## 2017-06-30 NOTE — PROGRESS NOTES
"Internal Medicine    Subjective:      Patient ID: Jia Marcelo is a 21 y.o. female.    Chief Complaint: Tailbone Pain (1Xweek)    Presents for urgent visit for pain in tailbone.      Went down water slide 2 weeks ago and flipped over.  Had a little tail bone pain.  Exercised on Sunday (squats) and strained it again.  Now having more pain.  Not taking any medication for the pain.          Review of Systems   Constitutional: Negative for chills and fever.   Respiratory: Negative for shortness of breath.    Cardiovascular: Negative for chest pain.   Gastrointestinal: Negative for abdominal pain.   Genitourinary: Negative for difficulty urinating.   Musculoskeletal: Positive for arthralgias (As per HPI). Negative for joint swelling (  As per HPI) and myalgias.   Skin: Negative for rash and wound.   Neurological: Negative for headaches.   Psychiatric/Behavioral: Negative for confusion.       Past medical history, surgical history, and family medical history reviewed and updated as appropriate.    Medications and allergies reviewed.     Objective:     BP (!) 122/93   Pulse 89   Ht 5' 1" (1.549 m)   Wt 58.7 kg (129 lb 6.6 oz)   LMP 06/27/2017   BMI 24.45 kg/m²   Physical Exam   Constitutional: She is oriented to person, place, and time. She appears well-developed and well-nourished.   HENT:   Head: Normocephalic and atraumatic.   Eyes: EOM are normal.   Pulmonary/Chest: Breath sounds normal.   Musculoskeletal:   Tenderness over coccyx.  Not limiting movement or gait.   Neurological: She is alert and oriented to person, place, and time.   Psychiatric: She has a normal mood and affect. Her behavior is normal.       Assessment:     1. Coccyx pain        Plan:   Jia was seen today for tailbone pain.    Diagnoses and all orders for this visit:    Coccyx pain   NSAIDs.  Recommend donut pillow for sitting.  Ice and heat as needed.  Plan for x-ray if not improved in 1-2 weeks.  -     naproxen (EC NAPROSYN) 500 MG EC " tablet; Take 1 tablet (500 mg total) by mouth 2 (two) times daily.    Return if symptoms worsen or fail to improve.    Christi Simmons MD  Internal Medicine  [facility]

## 2017-07-05 ENCOUNTER — OFFICE VISIT (OUTPATIENT)
Dept: INTERNAL MEDICINE | Facility: CLINIC | Age: 22
End: 2017-07-05
Payer: COMMERCIAL

## 2017-07-05 VITALS
HEART RATE: 91 BPM | DIASTOLIC BLOOD PRESSURE: 68 MMHG | SYSTOLIC BLOOD PRESSURE: 118 MMHG | WEIGHT: 127.44 LBS | HEIGHT: 61 IN | TEMPERATURE: 98 F | BODY MASS INDEX: 24.06 KG/M2

## 2017-07-05 DIAGNOSIS — J30.9 ALLERGIC RHINITIS, UNSPECIFIED ALLERGIC RHINITIS TRIGGER, UNSPECIFIED RHINITIS SEASONALITY: ICD-10-CM

## 2017-07-05 DIAGNOSIS — L02.91 ABSCESS: Primary | ICD-10-CM

## 2017-07-05 PROCEDURE — 99999 PR PBB SHADOW E&M-EST. PATIENT-LVL IV: CPT | Mod: PBBFAC,,, | Performed by: PHYSICIAN ASSISTANT

## 2017-07-05 PROCEDURE — 99213 OFFICE O/P EST LOW 20 MIN: CPT | Mod: S$GLB,,, | Performed by: PHYSICIAN ASSISTANT

## 2017-07-05 RX ORDER — MUPIROCIN 20 MG/G
OINTMENT TOPICAL 2 TIMES DAILY
Qty: 30 G | Refills: 0 | Status: SHIPPED | OUTPATIENT
Start: 2017-07-05 | End: 2018-08-06

## 2017-07-05 RX ORDER — CETIRIZINE HYDROCHLORIDE 10 MG/1
10 TABLET ORAL DAILY
Qty: 30 TABLET | Refills: 5 | Status: SHIPPED | OUTPATIENT
Start: 2017-07-05

## 2017-07-05 RX ORDER — SULFAMETHOXAZOLE AND TRIMETHOPRIM 800; 160 MG/1; MG/1
1 TABLET ORAL 2 TIMES DAILY
Qty: 20 TABLET | Refills: 0 | Status: SHIPPED | OUTPATIENT
Start: 2017-07-05 | End: 2018-01-02 | Stop reason: ALTCHOICE

## 2017-07-05 NOTE — PATIENT INSTRUCTIONS
Abscess (Antibiotic Treatment Only)  An abscess (sometimes called a boil) happens when bacteria get trapped under the skin and start to grow. Pus forms inside the abscess as the body responds to the bacteria. An abscess can happen with an insect bite, ingrown hair, blocked oil gland, pimple, cyst, or puncture wound.  In the early stages, your wound may be red and tender. For this stage, you may get antibiotics. If the abscess does not get better with antibiotics, it will need to be drained with a small cut.  Home care  These tips will help you care for your abscess at home:  · Soak the wound in hot water or apply hot packs (small towel soaked in hot water) to the area for 20 minutes at a time. Do this 3 to 4 times a day.  · Do not cut, squeeze, or pop the boil yourself.  · Apply antibiotic cream or ointment to the skin 3 to 4 times a day, unless something else was prescribed. Some ointments include an antibiotic plus a pain reliever.  · If your doctor prescribed antibiotics, do not stop taking them until you have finished the medicine or the doctor tells you to stop.  · You may use an over-the-counter pain medicine to control pain, unless another pain medicine was prescribed. If you have chronic liver or kidney disease or ever had a stomach ulcer or gastrointestinal bleeding, talk with your doctor before using these any of these.  Follow-up care  Follow up with your healthcare provider, or as advised. Check your wound each day for the signs of worsening infection listed below.  When to seek medical advice  Get prompt medical attention if any of these occur:  · An increase in redness or swelling  · Red streaks in the skin leading away from the abscess  · An increase in local pain or swelling  · Fever of 100.4ºF (38ºC) or higher, or as directed by your healthcare provider  · Pus or fluid coming from the abscess  · Boil returns after getting better  Date Last Reviewed: 9/1/2016  © 4466-8679 [employer] [address] [place] [address]. All rights reserved. This information is not intended as a substitute for professional medical care. Always follow your healthcare professional's instructions.

## 2017-07-05 NOTE — PROGRESS NOTES
Subjective:       Patient ID: Jia Marcelo is a 21 y.o. female.        Chief Complaint: Recurrent Skin Infections (buttocks x2weeks pain=5)    Jia Marcelo is an established patient of Haily Mcintosh MD here today for urgent care visit.    Her mother and sister are present for visit as well.      Boil left side of buttocks x 1-2 weeks.  Initially just with pain and thought she injured her buttocks during a fall.  Gradually developed redness, warmth, and increased tenderness.  Yesterday, the boil spontaneously drained and some clear-yellow pus came out.  She felt significant relief in pain after this.  No fever, chills, sweats, body aches.           Review of Systems   Constitutional: Negative for chills, diaphoresis, fatigue and fever.   HENT: Negative for congestion and sore throat.    Eyes: Negative for visual disturbance.   Respiratory: Negative for cough, chest tightness and shortness of breath.    Cardiovascular: Negative for chest pain, palpitations and leg swelling.   Gastrointestinal: Negative for abdominal pain, blood in stool, constipation, diarrhea, nausea and vomiting.   Genitourinary: Negative for dysuria, frequency, hematuria and urgency.   Musculoskeletal: Negative for arthralgias and back pain.   Skin: Positive for wound (abscess, buttocks). Negative for rash.   Neurological: Negative for dizziness, syncope, weakness and headaches.   Psychiatric/Behavioral: Negative for dysphoric mood and sleep disturbance. The patient is not nervous/anxious.        Objective:      Physical Exam   Constitutional: She appears well-developed and well-nourished. No distress.   HENT:   Head: Normocephalic and atraumatic.   Right Ear: External ear normal.   Left Ear: External ear normal.   Neck: Neck supple.   Pulmonary/Chest: Effort normal.   Abdominal: Soft.   Musculoskeletal: She exhibits no edema.   Skin: Skin is warm and dry. She is not diaphoretic.        Psychiatric: She has a normal mood and affect.      "  Assessment:       1. Abscess    2. Allergic rhinitis, unspecified allergic rhinitis trigger, unspecified rhinitis seasonality        Plan:       Jia was seen today for recurrent skin infections.    Diagnoses and all orders for this visit:    Abscess-if not improving to let me know, may need to see general surgery vs CRS  -     sulfamethoxazole-trimethoprim 800-160mg (BACTRIM DS) 800-160 mg Tab; Take 1 tablet by mouth 2 (two) times daily.  -     mupirocin (BACTROBAN) 2 % ointment; Apply topically 2 (two) times daily.    Allergic rhinitis, unspecified allergic rhinitis trigger, unspecified rhinitis seasonality-refilled medication  -     cetirizine (ZYRTEC) 10 MG tablet; Take 1 tablet (10 mg total) by mouth once daily.    Continue warm compresses to the affected area.    Pt has been given instructions populated from [employer] database and has verbalized understanding of the after visit summary and information contained wherein.    Follow up with a primary care provider. May go to ER for acute shortness of breath, lightheadedness, fever, or any other emergent complaints or changes in condition.    "This note will be shared with the patient"    No future appointments.            "

## 2017-07-13 ENCOUNTER — TELEPHONE (OUTPATIENT)
Dept: INTERNAL MEDICINE | Facility: CLINIC | Age: 22
End: 2017-07-13

## 2017-07-13 NOTE — TELEPHONE ENCOUNTER
----- Message from Bettyjohnathan Richard sent at 7/13/2017 10:47 AM CDT -----  Contact: pt [phone]  Pt faxed over a meningitis form to  [phone] and is hoping that you can fill this out.  Form can it be emailed to her via portal at your earliest convenience for school registration. The deadline to turn this in is 7/21/17.    Pt thanks you you ahead of time for your promptness.  And has requested this message to be made high priority,    Pt can be reached at [phone]    Thanks  ratna

## 2018-01-02 ENCOUNTER — OFFICE VISIT (OUTPATIENT)
Dept: INTERNAL MEDICINE | Facility: CLINIC | Age: 23
End: 2018-01-02
Payer: COMMERCIAL

## 2018-01-02 VITALS
HEART RATE: 74 BPM | WEIGHT: 123.5 LBS | SYSTOLIC BLOOD PRESSURE: 118 MMHG | DIASTOLIC BLOOD PRESSURE: 80 MMHG | HEIGHT: 61 IN | TEMPERATURE: 98 F | BODY MASS INDEX: 23.32 KG/M2

## 2018-01-02 DIAGNOSIS — L73.2 HYDRADENITIS: Primary | ICD-10-CM

## 2018-01-02 PROCEDURE — 99999 PR PBB SHADOW E&M-EST. PATIENT-LVL III: CPT | Mod: PBBFAC,,, | Performed by: INTERNAL MEDICINE

## 2018-01-02 PROCEDURE — 99213 OFFICE O/P EST LOW 20 MIN: CPT | Mod: S$GLB,,, | Performed by: INTERNAL MEDICINE

## 2018-01-02 RX ORDER — SULFAMETHOXAZOLE AND TRIMETHOPRIM 800; 160 MG/1; MG/1
1 TABLET ORAL 2 TIMES DAILY
Qty: 20 TABLET | Refills: 0 | Status: SHIPPED | OUTPATIENT
Start: 2018-01-02 | End: 2018-01-12

## 2018-01-02 NOTE — PROGRESS NOTES
Subjective:       Patient ID: Jia Marcelo is a 22 y.o. female.    Chief Complaint: Recurrent Skin Infections (boils under arm and groin)    Patient of Dr. Mcintosh here for an urgent visit c/o boils. History of previous similar episodes, last treated for a boil on buttock six months ago with Bactrim. Currently reporting recurrent lesions in both axilla, in right groin just outside the labia majora and right medial buttock - scant drainage from this one only. No associated fever, chills. Moderate pain, not using any analgesics. Has not had any antibiotics recently, but just finished a course of Tamiflu (respiratory infection is resolving).    Past history reviewed, no major medical problems.   Allergies: Flagyl.      Review of Systems   Genitourinary:        LMP 2 weeks ago.       Objective:    Afebrile, VSS - see nurse's notes.  Physical Exam   Constitutional: She appears well-developed and well-nourished. No distress.   Cardiovascular: Normal rate, regular rhythm and normal heart sounds.    Pulmonary/Chest: Effort normal and breath sounds normal. No respiratory distress.   Skin: She is not diaphoretic.   Areas of induration in both axilla with no severe inflammation, tenderness, fluctuance or drainage. Area of induration in right groin lateral to labia majora is more tender, about 1x2cm size, nonfluctuant. Area on right medial lower buttock is a small 1cm induration that has already drained, no open sore, fluctuance.        Assessment:       1. Hydradenitis        Plan:       Hydradenitis  -     sulfamethoxazole-trimethoprim 800-160mg (BACTRIM DS) 800-160 mg Tab; Take 1 tablet by mouth 2 (two) times daily.  Dispense: 20 tablet; Refill: 0  Warm compress/sitz baths, OTC analgesics prn, avoid shaving the involved areas for a couple of weeks.  Consider Gyn eval for the groin lesion that may end up requiring a drainage procedure.

## 2018-03-12 ENCOUNTER — OFFICE VISIT (OUTPATIENT)
Dept: INTERNAL MEDICINE | Facility: CLINIC | Age: 23
End: 2018-03-12
Payer: COMMERCIAL

## 2018-03-12 VITALS
HEART RATE: 79 BPM | TEMPERATURE: 98 F | HEIGHT: 61 IN | WEIGHT: 127.63 LBS | SYSTOLIC BLOOD PRESSURE: 118 MMHG | DIASTOLIC BLOOD PRESSURE: 68 MMHG | OXYGEN SATURATION: 99 % | BODY MASS INDEX: 24.1 KG/M2

## 2018-03-12 DIAGNOSIS — L05.91 PILONIDAL CYST: Primary | ICD-10-CM

## 2018-03-12 PROCEDURE — 99999 PR PBB SHADOW E&M-EST. PATIENT-LVL III: CPT | Mod: PBBFAC,,, | Performed by: NURSE PRACTITIONER

## 2018-03-12 PROCEDURE — 99212 OFFICE O/P EST SF 10 MIN: CPT | Mod: SA,S$GLB,, | Performed by: NURSE PRACTITIONER

## 2018-03-12 RX ORDER — TRAMADOL HYDROCHLORIDE 50 MG/1
50 TABLET ORAL EVERY 8 HOURS PRN
Qty: 9 TABLET | Refills: 0 | Status: SHIPPED | OUTPATIENT
Start: 2018-03-12 | End: 2018-03-15

## 2018-03-12 NOTE — PROGRESS NOTES
"Subjective:       Patient ID: Jia Marcelo is a 22 y.o. female.    Chief Complaint: Mass (on the tail bone)    HPI: 21 yo female that presents to clinic today for knot above her tail bone x 1 week.    States that she noticed it about one week ago.  Denies any previous history of "knots" to this area or any trauma.  States that the pain has increased over the past week.  Denies any drainage from site.  Denies any fever.    States that she has been taking ibuprofen and applying heat to the area with minimal relief.  Rates current pain level a 10/10 when sitting down or bending over.    Review of Systems   Constitutional: Negative for appetite change, chills, fatigue and fever.   Respiratory: Negative for apnea, cough, shortness of breath and wheezing.    Cardiovascular: Negative for chest pain, palpitations and leg swelling.   Gastrointestinal: Negative for abdominal pain, constipation, diarrhea, nausea and vomiting.   Musculoskeletal: Negative for back pain, myalgias, neck pain and neck stiffness.        Admits pain to tailbone.   Skin: Negative for rash and wound.        Admits lump above tailbone   Neurological: Negative for dizziness, light-headedness, numbness and headaches.   Psychiatric/Behavioral: Negative for behavioral problems.       Objective:      Physical Exam   Constitutional: She is oriented to person, place, and time. She appears well-developed and well-nourished. She appears distressed.   Neck: Normal range of motion. Neck supple. No thyromegaly present.   Cardiovascular: Normal rate, regular rhythm, normal heart sounds and intact distal pulses.    No murmur heard.  Pulmonary/Chest: Effort normal and breath sounds normal. No respiratory distress. She has no wheezes. She has no rales.   Abdominal: Soft. Bowel sounds are normal. She exhibits no distension and no mass. There is no tenderness.   Lymphadenopathy:     She has no cervical adenopathy.   Neurological: She is alert and oriented to " person, place, and time. No sensory deficit.   Skin: Skin is warm, dry and intact. No abrasion, no laceration, no lesion and no rash noted. No erythema.        Firm, 1cm tender mass located near the tailbone at the top of the cleft of the buttocks consistent with pilonidal cyst.  There is no drainage noted.   Psychiatric: Her behavior is normal.       Assessment:       1. Pilonidal cyst        Plan:     1. Pilonidal cyst   -Physical exam findings consistent with pilonidal cyst.  -Will refer patient to general surgery for further evaluation and management.  Appointment scheduled for 3/14/18 at 1100.  -Will give patient small prescription for tramadol for breakthrough pain as needed.  -Can continue to take ibuprofen as needed.  -Continue to use warm compresses to area.  -Recommended to use donut pillow when sitting to help with pain relief.

## 2018-03-14 ENCOUNTER — OFFICE VISIT (OUTPATIENT)
Dept: SURGERY | Facility: CLINIC | Age: 23
End: 2018-03-14
Payer: COMMERCIAL

## 2018-03-14 VITALS
BODY MASS INDEX: 24.22 KG/M2 | HEART RATE: 97 BPM | TEMPERATURE: 99 F | SYSTOLIC BLOOD PRESSURE: 126 MMHG | DIASTOLIC BLOOD PRESSURE: 70 MMHG | WEIGHT: 128.31 LBS | HEIGHT: 61 IN

## 2018-03-14 DIAGNOSIS — L05.91 PILONIDAL CYST: Primary | ICD-10-CM

## 2018-03-14 PROCEDURE — 99203 OFFICE O/P NEW LOW 30 MIN: CPT | Mod: S$GLB,,, | Performed by: SURGERY

## 2018-03-14 PROCEDURE — 99999 PR PBB SHADOW E&M-EST. PATIENT-LVL III: CPT | Mod: PBBFAC,,, | Performed by: SURGERY

## 2018-03-14 RX ORDER — SULFAMETHOXAZOLE AND TRIMETHOPRIM 800; 160 MG/1; MG/1
1 TABLET ORAL 2 TIMES DAILY
Qty: 20 TABLET | Refills: 1 | Status: SHIPPED | OUTPATIENT
Start: 2018-03-14

## 2018-03-14 RX ORDER — HYDROCODONE BITARTRATE AND ACETAMINOPHEN 5; 325 MG/1; MG/1
1 TABLET ORAL EVERY 6 HOURS PRN
Qty: 20 TABLET | Refills: 0 | Status: SHIPPED | OUTPATIENT
Start: 2018-03-14 | End: 2018-08-06

## 2018-03-14 NOTE — PROGRESS NOTES
History & Physical    SUBJECTIVE:     History of Present Illness:  Patient is a 22 y.o. female presents with tender area near tailbone. Onset of symptoms was gradual starting 1 week ago with gradually worsening course since that time. Patient denies drainage. Symptoms are aggravated by direct contact and sitting. Symptoms improve with analgesics.      Chief Complaint   Patient presents with    Consult    Cyst       Review of patient's allergies indicates:   Allergen Reactions    Flagyl [metronidazole hcl] Nausea And Vomiting       Current Outpatient Prescriptions   Medication Sig Dispense Refill    cetirizine (ZYRTEC) 10 MG tablet Take 1 tablet (10 mg total) by mouth once daily. 30 tablet 5    drospirenone-ethinyl estradiol (TRISTEN, 28,) 3-0.03 mg per tablet Take 1 tablet by mouth once daily. 28 tablet 11    EPIDUO 0.1-2.5 % GlwP JOSE CRUZ AA ON FACE QHS  3    fluticasone (FLONASE) 50 mcg/actuation nasal spray 1 spray by Each Nare route once daily. (Patient taking differently: 1 spray by Each Nare route daily as needed. ) 16 g 2    mupirocin (BACTROBAN) 2 % ointment Apply topically 2 (two) times daily. 30 g 0    naproxen (EC NAPROSYN) 500 MG EC tablet Take 1 tablet (500 mg total) by mouth 2 (two) times daily. 30 tablet 0    pseudoephedrine-guaifenesin (MUCINEX D MAXIMUM STRENGTH) 120-1,200 mg Tb12 1 tab every 12 hours as needed for congestion 24 each 0    sumatriptan (IMITREX) 25 MG Tab Two at start of headache; repeat in 2 hours if needed 30 tablet 5    traMADol (ULTRAM) 50 mg tablet Take 1 tablet (50 mg total) by mouth every 8 (eight) hours as needed for Pain. 9 tablet 0    hydrocodone-acetaminophen 5-325mg (NORCO) 5-325 mg per tablet Take 1 tablet by mouth every 6 (six) hours as needed for Pain. 20 tablet 0    sulfamethoxazole-trimethoprim 800-160mg (BACTRIM DS) 800-160 mg Tab Take 1 tablet by mouth 2 (two) times daily. 20 tablet 1     No current facility-administered medications for this visit.   "      Past Medical History:   Diagnosis Date    Allergy     flonase    Amblyopia     [facility]     High cholesterol     Migraine headache     controlled with imitrex     No past surgical history on file.  Family History   Problem Relation Age of Onset    Cancer Mother 40     breast CA    Diabetes Mother     Hypertension Mother     Migraines Mother     Heart disease Mother     No Known Problems Father     Other Sister     Cancer Paternal Aunt 41     breast CA    Hypertension Maternal Grandfather     Depression Paternal Grandmother     Cancer Maternal Grandmother 45     breast CA    Colon cancer Neg Hx     Ovarian cancer Neg Hx      Social History   Substance Use Topics    Smoking status: Never Smoker    Smokeless tobacco: Never Used    Alcohol use No        Review of Systems:      I have reviewed the Patient Summary Reports.        Review of Systems  Anesthesia Hx:  No problems with previous Anesthesia    Social:  Non-Smoker    Hematology/Oncology:  Hematology Normal   Oncology Normal     EENT/Dental:EENT/Dental Normal   Cardiovascular:  Cardiovascular Normal     Renal/:  Renal/ Normal     Hepatic/GI:  Hepatic/GI Normal    Musculoskeletal:  Musculoskeletal Normal    Neurological:   Headaches    Psych:  Psychiatric Normal           OBJECTIVE:     Vital Signs (Most Recent)  Temp: 99 °F (37.2 °C) (03/14/18 1052)  Pulse: 97 (03/14/18 1052)  BP: 126/70 (03/14/18 1052)  5' 1" (1.549 m)  58.2 kg (128 lb 4.9 oz)     Physical Exam:    Physical Exam  General:  Well nourished    Airway/Jaw/Neck:        Dental:  DENTAL FINDINGS: Normal   Chest/Lungs:  Chest/Lungs Clear    Heart/Vascular:  Heart Findings: Normal Heart murmur: negative Vascular Findings: Normal    Abdomen:  Abdomen Findings: Normal    Musculoskeletal:  Musculoskeletal Findings: Normal   Skin:  Skin Findings:  Tender area over coccyx without flocculence or drainage    "       Laboratory  none    Diagnostic Results:  none    ASSESSMENT/PLAN:     prob pilonidal cyst    PLAN:Plan     Oral antibiotics and repeat exam

## 2018-03-14 NOTE — LETTER
March 14, 2018      Dave Wan, NP  [address]  [place] [address]           [facility] - General Surgery  [address]  [place] [address]  Phone: [phone]          Patient: Jia Marcelo   MR Number: [id number]   YOB: 1995   Date of Visit: 3/14/2018       Dear Dave Wan:    Thank you for referring Jia Marcelo to me for evaluation. Attached you will find relevant portions of my assessment and plan of care.    If you have questions, please do not hesitate to call me. I look forward to following Jia Marcelo along with you.    Sincerely,    Geoff Garzon MD    Enclosure  CC:  No Recipients    If you would like to receive this communication electronically, please contact [email or web address] or [phone] to request more information on [employer] access.    For providers and/or their staff who would like to refer a patient to [facility], please contact us through our one-stop-shop provider referral line, [facility] , at [phone].    If you feel you have received this communication in error or would no longer like to receive these types of communications, please e-mail [email or web address]

## 2018-08-06 ENCOUNTER — OFFICE VISIT (OUTPATIENT)
Dept: OBSTETRICS AND GYNECOLOGY | Facility: CLINIC | Age: 23
End: 2018-08-06
Payer: COMMERCIAL

## 2018-08-06 ENCOUNTER — OFFICE VISIT (OUTPATIENT)
Dept: OPTOMETRY | Facility: CLINIC | Age: 23
End: 2018-08-06
Payer: COMMERCIAL

## 2018-08-06 VITALS
DIASTOLIC BLOOD PRESSURE: 64 MMHG | WEIGHT: 127.44 LBS | SYSTOLIC BLOOD PRESSURE: 120 MMHG | HEIGHT: 61 IN | BODY MASS INDEX: 24.06 KG/M2

## 2018-08-06 DIAGNOSIS — Z30.011 ENCOUNTER FOR INITIAL PRESCRIPTION OF CONTRACEPTIVE PILLS: Primary | ICD-10-CM

## 2018-08-06 DIAGNOSIS — G43.709 CHRONIC MIGRAINE WITHOUT AURA WITHOUT STATUS MIGRAINOSUS, NOT INTRACTABLE: ICD-10-CM

## 2018-08-06 DIAGNOSIS — L02.91 ABSCESS: ICD-10-CM

## 2018-08-06 DIAGNOSIS — H52.01 HYPEROPIA, RIGHT: ICD-10-CM

## 2018-08-06 DIAGNOSIS — H53.021 ANISOMETROPIC AMBLYOPIA OF RIGHT EYE: Primary | ICD-10-CM

## 2018-08-06 DIAGNOSIS — H52.31 ANISOMETROPIA: ICD-10-CM

## 2018-08-06 PROCEDURE — 92015 DETERMINE REFRACTIVE STATE: CPT | Mod: S$GLB,,, | Performed by: OPTOMETRIST

## 2018-08-06 PROCEDURE — 3008F BODY MASS INDEX DOCD: CPT | Mod: CPTII,S$GLB,, | Performed by: NURSE PRACTITIONER

## 2018-08-06 PROCEDURE — 99213 OFFICE O/P EST LOW 20 MIN: CPT | Mod: S$GLB,,, | Performed by: NURSE PRACTITIONER

## 2018-08-06 PROCEDURE — 99999 PR PBB SHADOW E&M-EST. PATIENT-LVL III: CPT | Mod: PBBFAC,,, | Performed by: NURSE PRACTITIONER

## 2018-08-06 PROCEDURE — 99999 PR PBB SHADOW E&M-EST. PATIENT-LVL II: CPT | Mod: PBBFAC,,, | Performed by: OPTOMETRIST

## 2018-08-06 PROCEDURE — 92014 COMPRE OPH EXAM EST PT 1/>: CPT | Mod: S$GLB,,, | Performed by: OPTOMETRIST

## 2018-08-06 RX ORDER — DROSPIRENONE AND ETHINYL ESTRADIOL 0.03MG-3MG
1 KIT ORAL DAILY
Qty: 84 TABLET | Refills: 0 | Status: SHIPPED | OUTPATIENT
Start: 2018-08-06 | End: 2019-08-06

## 2018-08-06 RX ORDER — SUMATRIPTAN SUCCINATE 25 MG/1
TABLET ORAL
Qty: 30 TABLET | Refills: 5 | Status: SHIPPED | OUTPATIENT
Start: 2018-08-06

## 2018-08-06 RX ORDER — MUPIROCIN 20 MG/G
OINTMENT TOPICAL 2 TIMES DAILY
Qty: 30 G | Refills: 0 | Status: SHIPPED | OUTPATIENT
Start: 2018-08-06

## 2018-08-06 NOTE — PROGRESS NOTES
CC: BTB and birth control.     HPI: Pt is a 22 y.o.  female who presents for birth control. She is a new patient to me. Seen here by another NP in 2/2017. She was started on OCPs at that time. States she has since stopped them because at the time she thought they were the cause of her vaginal infections; she is now unsure. Since getting off her acne has worsened and she is having BTB. She declines STD testing today. No pap on record; pt reports a normal pap done in 2018 at an outside facility. She would like to get back on OCPs today. She currently lives in [place] and is just here visiting. She does not smoke cigarettes. She denies personal hx of DVT or HTN. She is on her cycle today and declines an exam. LMP 7/31/18. Here with a friend today.     ROS:  GENERAL: Feeling well overall. Denies fever or chills.   SKIN: Denies rash or lesions.   HEAD: Denies head injury or headache.   NODES: Denies enlarged lymph nodes.   CHEST: Denies chest pain or shortness of breath.   CARDIOVASCULAR: Denies palpitations or left sided chest pain.   ABDOMEN: No abdominal pain, constipation, diarrhea, nausea, vomiting or rectal bleeding.   URINARY: No dysuria, hematuria, or burning on urination.  REPRODUCTIVE: See HPI.   BREASTS: Denies pain, lumps, or nipple discharge.   HEMATOLOGIC: No easy bruisability or excessive bleeding.   MUSCULOSKELETAL: Denies joint pain or swelling.   NEUROLOGIC: Denies syncope or weakness.   PSYCHIATRIC: Denies depression, anxiety or mood swings.     PE:   APPEARANCE: Well nourished, well developed, Black or  female in no acute distress.  PELVIC: Deferred-pt declines    Diagnosis:  1. Encounter for initial prescription of contraceptive pills        Plan:     Orders Placed This Encounter    drospirenone-ethinyl estradiol (TRISTEN, 28,) 3-0.03 mg per tablet     Pt chooses to get back on OCPs. D/w pt need to f/u in [place] in 3 months for OCP BP check and annual exam. Pt verbalized understanding.    Rx OCPs x 3 months only-The use of the oral contraceptive has been fully discussed with the patient. This includes the proper method to initiate and continue the pills, the need for regular compliance to ensure adequate contraceptive effect, the physiology which make the pill effective, the instructions for what to do in event of a missed pill, and warnings about anticipated minor side effects such as breakthrough spotting, nausea, breast tenderness, weight changes, acne, headaches, etc.  She has been told of the more serious potential side effects such as MI, stroke, and deep vein thrombosis, all of which are very unlikely.  She has been asked to report any signs of such serious problems immediately.  She should back up the pill with a condom during any cycle in which antibiotics are prescribed, and during the first cycle as well. The need for additional protection, such as a condom, to prevent exposure to sexually transmitted diseases has also been discussed- the patient has been clearly reminded that OCP's cannot protect her against diseases such as HIV and others. She understands and wishes to take the medication as prescribed.       Answers for HPI/ROS submitted by the patient on 8/4/2018   Gynecologic exam  genital itching: No  genital lesions: No  genital odor: No  genital rash: No  missed menses: No  pelvic pain: No  vaginal bleeding: Yes  vaginal discharge: Yes  Chronicity: recurrent  Onset: 1 to 4 weeks ago  Frequency: intermittently  Progression since onset: unchanged  Pain severity: no pain  Affected side: both  Pregnant now?: No  abdominal pain: Yes  anorexia: No  chills: No  constipation: No  discolored urine: No  dysuria: No  flank pain: Yes  frequency: No  hematuria: No  nausea: No  rash: No  urgency: No  Please select the characteristics of your discharge: : bloody, brown  Vaginal bleeding: typical of menses  Passing clots?: Yes  Passing tissue?: No  Aggravated by: nothing  treatments tried:  acetaminophen, heating pad, NSAIDs  Improvement on treatment: moderate  Sexual activity: sexually active  Partner with STD symptoms: no  Birth control: nothing, condoms  Menstrual history: irregular  STD: No  abdominal surgery: No   section: No  Ectopic pregnancy: No  Endometriosis: No  herpes simplex: No  gynecological surgery: No  menorrhagia: Yes  metrorrhagia: No  miscarriage: No  ovarian cysts: No  perineal abscess: No  PID: No  terminated pregnancy: No  vaginosis: Yes

## 2018-08-06 NOTE — PROGRESS NOTES
HPI     Ms. Jia Marcelo is here for a routine eye exam.    Patient complains of losing her glasses about 6 months ago. She complains   of blurry distance vision without glasses.     Would patient like a refraction today? Yes  Patient would like to defer dilation until next year.     (-)drops  (-)flashes  (-)floaters  (-)diplopia    Diabetic no   No results found for: HGBA1C    OCULAR HISTORY  Last Eye Exam 01/04/17 with Dr. 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 (History)            Assessment /Plan     For exam results, see Encounter Report.    Anisometropic amblyopia of right eye   As previously noted. Best corrected visual acuity stable (20/70+). Recommended full-time wear of polycarbonate lenses.    Anisometropia  Hyperopia, right   Final Rx decreased OD to aid adaptation. New glasses prescription released, adaptation expected.    Eyeglass Final Rx     Eyeglass Final Rx       Sphere Cylinder Axis    Right +1.50 +0.75 095    Left Edgar +0.50 075    Expiration Date:  8/7/2019    Comments:  Polycarbonate, full-time wear.                  RTC 1 year with DFE

## 2018-08-06 NOTE — PATIENT INSTRUCTIONS
AMBLYOPIA (LAZY EYE)    Amblyopia, also known as lazy eye, occurs when the central vision does not develop properly.    Amblyopia generally develops in young children, before age six. Its symptoms often are noted by parents in the form of squinting or closing one eye to see.  Other signs include overall poor visual acuity, eyestrain and headaches.    Causes of Amblyopia:    Trauma to the eye at any age can cause amblyopia, as well as a strong uncorrected refractive error (nearsightedness or farsightedness) or an eye muscle imbalance (strabismus).     Amblyopia Treatment    It's important to correct amblyopia as early as possible to allow maximum development of vision.    Children can be treated by patching the stronger eye, providing glasses to correct nearsightedness or farsightedness,  and in some cases using atropine eye drops.  Placing a patch over the stronger eye will force the weaker eye to learn to see.  Patching may be required for several hours a day and may continue for weeks or months.  Amblyopia will not go away on its own, and untreated  can lead to permanent visual problems and poor depth perception. If later in life the child's stronger eye develops disease or is injured, he or she will be dependent on the poor vision of the amblyopic eye, so it is best to treat amblyopia early on.

## 2018-08-06 NOTE — TELEPHONE ENCOUNTER
----- Message from Bibi Kinney sent at 8/6/2018 10:11 AM CDT -----  Pt is requesting a refill on the following prescriptions: Imitrex 25 mg and BACTROBAN.   Pt can be contact at [phone].    Thank you

## 2021-02-02 ENCOUNTER — TELEPHONE (OUTPATIENT)
Dept: INTERNAL MEDICINE | Facility: CLINIC | Age: 26
End: 2021-02-02

## 2021-02-03 ENCOUNTER — TELEPHONE (OUTPATIENT)
Dept: INTERNAL MEDICINE | Facility: CLINIC | Age: 26
End: 2021-02-03

## 2021-04-16 ENCOUNTER — PATIENT MESSAGE (OUTPATIENT)
Dept: RESEARCH | Facility: HOSPITAL | Age: 26
End: 2021-04-16

## 2022-11-23 NOTE — PROGRESS NOTES
"Subjective:       Patient ID: Jia Marcelo is a 21 y.o. female.    Chief Complaint: Migraine    HPI urgent, migraines.    Daily migraines. Almost always after she wakes up in the morning (does not wake her up from sleep) but sometimes occur when she's awake. When she feels slight pain in the R temple area, she'll take either ibuprofen or tylenol. She'll then wait to see if it subsides. Lately HAs are worse after ibuprofen/tylenol. Worse in the sense that it makes her chest tightness, palpitations, nauseous and feel hot. Still w/ photophobia and phonophobia. Sometimes will take fioricet (prescribed in ED 2/12/17) and it'll go away after a few hours.    Has been taking analgesics daily for it.     Has had migraines since elementary school. Reports last time she's had very bad migraines, she was having her finals. Currently doesn't feel especially stressed out. LMP ended 2/12/2017.    She stopped taking the propranolol as she thought this was causing more headaches.     Previously seen Dr. Park in peds neurology.    Takes clarissa and zyrtec daily. No change in birth control. Hasn't needed flonase recently.     No tingling or weakness. Reports sometimes her ankles would feel numb.     Tingling sensation after urination. frequent urination.  Symptoms a little over a week. No fevers/chills. No back pain.     Review of Systems  as above in HPI.    Objective:      Physical Exam    Visit Vitals    /78    Pulse 85    Resp 17    Ht 5' 1" (1.549 m)    Wt 58.2 kg (128 lb 4.9 oz)    LMP 02/09/2017    BMI 24.24 kg/m2     GEN - A+OX4, NAD   HEENT - PERRL, EOMI, OP clear  Neck - No thyromegaly or cervical LAD. No thyroid masses felt.  CV - RRR, no m/r   Chest - CTAB, no wheezing or rhonchi  Abd - S/NT/ND/+BS.   Ext - 2+BDP and radial pulses. No C/C/E.  Neuro - PERRL, EOMI, no nystagmus, eyebrow raise, facial sensation, hearing, m of mastication, smile, palatal raise, shoulder shrug, tongue protrusion symmetric " She has symptoms of PAULINA  Advised to go for the HST  Loose weight and intact. 5/5 BUE and BLE strength. Sensation to light touch intact throughout. Normal gait. No dysdiadokinesia.  MSK - no spinal tenderness to palpation.  Skin - No rash.    Notes from Dr. Park reviewed.    Assessment/Plan     Jia was seen today for migraine.    Diagnoses and all orders for this visit:    Chronic migraine without aura without status migrainosus, not intractable - currently does not have HA. In the last 2 weeks, HAs have changed - daily. No improvement w/ meds. Rebound HA? Daily morning HAs. Will get MRI to r/o IC masses. Refilled imitrex. Stop fioricet, ibuprofen, tylenol. Discussed about rebound HA. Do not take breakthrough medicines more than 3 days a week. Trial of amitriptyline. Reports propranolol didn't help so stopped it about a week ago.  -     sumatriptan (IMITREX) 25 MG Tab; Two at start of headache; repeat in 2 hours if needed  -     Ambulatory Referral to Neurology  -     MRI Brain Without Contrast; Future  -     amitriptyline (ELAVIL) 50 MG tablet; Take 1/2 tab nightly.    Urinary frequency  -     Urinalysis  -     Urinalysis Microscopic  -     Urine culture    Palpitation  -     SCHEDULED EKG 12-LEAD (to Muse); Future    Return in about 2 months (around 4/22/2017).      Haily Mcintosh MD  Department of Internal Medicine - [facility]  11:40 AM